# Patient Record
Sex: MALE | Race: BLACK OR AFRICAN AMERICAN | NOT HISPANIC OR LATINO | Employment: UNEMPLOYED | URBAN - NONMETROPOLITAN AREA
[De-identification: names, ages, dates, MRNs, and addresses within clinical notes are randomized per-mention and may not be internally consistent; named-entity substitution may affect disease eponyms.]

---

## 2020-09-04 ENCOUNTER — HOSPITAL ENCOUNTER (EMERGENCY)
Facility: HOSPITAL | Age: 20
Discharge: HOME | End: 2020-09-04
Attending: EMERGENCY MEDICINE | Admitting: EMERGENCY MEDICINE

## 2020-09-04 VITALS
BODY MASS INDEX: 29.69 KG/M2 | RESPIRATION RATE: 16 BRPM | DIASTOLIC BLOOD PRESSURE: 90 MMHG | SYSTOLIC BLOOD PRESSURE: 127 MMHG | OXYGEN SATURATION: 99 % | TEMPERATURE: 98.6 F | WEIGHT: 224 LBS | HEART RATE: 74 BPM | HEIGHT: 73 IN

## 2020-09-04 DIAGNOSIS — R10.9 ABDOMINAL PAIN, UNSPECIFIED ABDOMINAL LOCATION: Primary | ICD-10-CM

## 2020-09-04 LAB — GLUCOSE BLDC GLUCOMTR-MCNC: 97 MG/DL (ref 70–130)

## 2020-09-04 PROCEDURE — 99283 EMERGENCY DEPT VISIT LOW MDM: CPT

## 2020-09-04 PROCEDURE — 82962 GLUCOSE BLOOD TEST: CPT

## 2020-09-04 NOTE — ED PROVIDER NOTES
Subjective   20-year-old male presents because he had a sudden sharp pain in the right side of his abdomen while driving, it lasted a few seconds, and then went away.  He states after the pain he felt sweaty and nauseated and dizzy for short while.  He is concerned about having diabetes because it runs in his family, and he was concerned about the abdominal pain.  It was sharp and lasted a few seconds and is completely resolved at this point.  He is never had this in the past, no nausea vomiting fever chills.      History provided by:  Patient   used: No        Review of Systems   Gastrointestinal: Positive for abdominal pain.   All other systems reviewed and are negative.      History reviewed. No pertinent past medical history.    No Known Allergies    History reviewed. No pertinent surgical history.    History reviewed. No pertinent family history.    Social History     Socioeconomic History   • Marital status:      Spouse name: Not on file   • Number of children: Not on file   • Years of education: Not on file   • Highest education level: Not on file   Tobacco Use   • Smoking status: Never Smoker   Substance and Sexual Activity   • Alcohol use: Yes     Comment: social   • Drug use: Not Currently           Objective   Physical Exam   Constitutional: He is oriented to person, place, and time. He appears well-developed and well-nourished.   HENT:   Head: Normocephalic and atraumatic.   Left Ear: External ear normal.   Eyes: EOM are normal.   Neck: Normal range of motion.   Cardiovascular: Normal rate and regular rhythm.   Pulmonary/Chest: Effort normal and breath sounds normal.   Abdominal: Soft. Bowel sounds are normal.   Pain completely resolved, normal abdominal exam   Musculoskeletal: Normal range of motion.   Neurological: He is alert and oriented to person, place, and time.   Skin: Skin is warm and dry.   Psychiatric: He has a normal mood and affect. His behavior is normal. Judgment  and thought content normal.   Nursing note and vitals reviewed.      Procedures           ED Course  ED Course as of Sep 04 1308   Fri Sep 04, 2020   1306 Abdomen is soft nontender nondistended, patient is in no acute distress, he has no signs of appendicitis, he request that we check his blood sugar because diabetes runs in his family.  At this time he is asking to be discharged he thinks the pain was probably nothing.    [CS]      ED Course User Index  [CS] Sherif Rapp Jr., PA-C                                           MDM  Number of Diagnoses or Management Options  Abdominal pain, unspecified abdominal location: new and requires workup     Amount and/or Complexity of Data Reviewed  Clinical lab tests: reviewed    Risk of Complications, Morbidity, and/or Mortality  Presenting problems: minimal  Management options: minimal    Patient Progress  Patient progress: stable      Final diagnoses:   Abdominal pain, unspecified abdominal location            Sherif Rapp Jr., PA-C  09/04/20 1308